# Patient Record
Sex: MALE | Race: WHITE | NOT HISPANIC OR LATINO | Employment: FULL TIME | URBAN - METROPOLITAN AREA
[De-identification: names, ages, dates, MRNs, and addresses within clinical notes are randomized per-mention and may not be internally consistent; named-entity substitution may affect disease eponyms.]

---

## 2024-10-14 ENCOUNTER — APPOINTMENT (OUTPATIENT)
Dept: OPHTHALMOLOGY | Facility: CLINIC | Age: 34
End: 2024-10-14
Payer: COMMERCIAL

## 2024-10-14 DIAGNOSIS — H52.203 MYOPIA OF BOTH EYES WITH ASTIGMATISM: Primary | ICD-10-CM

## 2024-10-14 DIAGNOSIS — H35.89 OTHER SPECIFIED RETINAL DISORDERS: ICD-10-CM

## 2024-10-14 DIAGNOSIS — H52.13 MYOPIA OF BOTH EYES WITH ASTIGMATISM: Primary | ICD-10-CM

## 2024-10-14 PROCEDURE — 92015 DETERMINE REFRACTIVE STATE: CPT | Performed by: OPTOMETRIST

## 2024-10-14 PROCEDURE — 92014 COMPRE OPH EXAM EST PT 1/>: CPT | Performed by: OPTOMETRIST

## 2024-10-14 ASSESSMENT — SLIT LAMP EXAM - LIDS
COMMENTS: NO PTOSIS OR RETRACTION, NORMAL CONTOUR
COMMENTS: NO PTOSIS OR RETRACTION, NORMAL CONTOUR

## 2024-10-14 ASSESSMENT — REFRACTION_MANIFEST
OS_SPHERE: -1.75
OD_CYLINDER: +2.00
OD_AXIS: 077
OD_SPHERE: -2.50
OS_CYLINDER: +1.25
OD_CYLINDER: +2.25
OS_AXIS: 105
OS_CYLINDER: +1.50
METHOD_AUTOREFRACTION: 1
OS_AXIS: 107
OD_SPHERE: -3.00
OS_SPHERE: -1.75
OD_AXIS: 075

## 2024-10-14 ASSESSMENT — VISUAL ACUITY
CORRECTION_TYPE: GLASSES
OD_CC+: -1
OS_CC: 20/20
METHOD: SNELLEN - LINEAR
OD_CC: 20/20
OS_CC: 20/20
OD_CC: 20/20

## 2024-10-14 ASSESSMENT — CUP TO DISC RATIO
OS_RATIO: .1
OD_RATIO: .1

## 2024-10-14 ASSESSMENT — CONF VISUAL FIELD
OD_NORMAL: 1
OS_INFERIOR_TEMPORAL_RESTRICTION: 0
OS_INFERIOR_NASAL_RESTRICTION: 0
OS_NORMAL: 1
OD_INFERIOR_NASAL_RESTRICTION: 0
METHOD: COUNTING FINGERS
OD_SUPERIOR_NASAL_RESTRICTION: 0
OS_SUPERIOR_NASAL_RESTRICTION: 0
OD_SUPERIOR_TEMPORAL_RESTRICTION: 0
OS_SUPERIOR_TEMPORAL_RESTRICTION: 0
OD_INFERIOR_TEMPORAL_RESTRICTION: 0

## 2024-10-14 ASSESSMENT — TONOMETRY
OD_IOP_MMHG: 12
OS_IOP_MMHG: 13
IOP_METHOD: GOLDMANN APPLANATION

## 2024-10-14 ASSESSMENT — REFRACTION_WEARINGRX
OD_CYLINDER: +2.25
OD_SPHERE: -2.25
OD_AXIS: 077
OS_CYLINDER: +1.50
OS_AXIS: 107
OS_SPHERE: -1.50

## 2024-10-14 ASSESSMENT — ENCOUNTER SYMPTOMS
PSYCHIATRIC NEGATIVE: 0
ENDOCRINE NEGATIVE: 0
ALLERGIC/IMMUNOLOGIC NEGATIVE: 0
CARDIOVASCULAR NEGATIVE: 0
GASTROINTESTINAL NEGATIVE: 0
MUSCULOSKELETAL NEGATIVE: 0
CONSTITUTIONAL NEGATIVE: 0
RESPIRATORY NEGATIVE: 0
HEMATOLOGIC/LYMPHATIC NEGATIVE: 0
NEUROLOGICAL NEGATIVE: 0

## 2024-10-14 ASSESSMENT — EXTERNAL EXAM - LEFT EYE: OS_EXAM: NORMAL

## 2024-10-14 ASSESSMENT — EXTERNAL EXAM - RIGHT EYE: OD_EXAM: NORMAL

## 2024-10-14 NOTE — PROGRESS NOTES
Assessment/Plan   Diagnoses and all orders for this visit:  Myopia of both eyes with astigmatism  New spec rx released today per patient request. Ocular health wnl for age OU. Monitor 1 year or sooner prn. Refraction billed today. Pt signed for the prescription today.   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 Continue to monitor at [facility] in 1 year.